# Patient Record
Sex: MALE | Race: WHITE | NOT HISPANIC OR LATINO | Employment: UNEMPLOYED | URBAN - NONMETROPOLITAN AREA
[De-identification: names, ages, dates, MRNs, and addresses within clinical notes are randomized per-mention and may not be internally consistent; named-entity substitution may affect disease eponyms.]

---

## 2022-03-23 ENCOUNTER — HOSPITAL ENCOUNTER (EMERGENCY)
Facility: HOSPITAL | Age: 42
Discharge: HOME | End: 2022-03-24
Attending: EMERGENCY MEDICINE | Admitting: EMERGENCY MEDICINE
Payer: COMMERCIAL

## 2022-03-23 ENCOUNTER — APPOINTMENT (EMERGENCY)
Dept: RADIOLOGY | Facility: HOSPITAL | Age: 42
End: 2022-03-23
Payer: COMMERCIAL

## 2022-03-23 DIAGNOSIS — T14.8XXA PUNCTURE WOUND: Primary | ICD-10-CM

## 2022-03-23 PROCEDURE — 99284 EMERGENCY DEPT VISIT MOD MDM: CPT | Performed by: EMERGENCY MEDICINE

## 2022-03-23 PROCEDURE — 73650 X-RAY EXAM OF HEEL: CPT

## 2022-03-23 PROCEDURE — 90471 IMMUNIZATION ADMIN: CPT

## 2022-03-23 PROCEDURE — 99283 EMERGENCY DEPT VISIT LOW MDM: CPT

## 2022-03-23 RX ORDER — CEPHALEXIN 250 MG/1
500 CAPSULE ORAL ONCE
Status: COMPLETED | OUTPATIENT
Start: 2022-03-24 | End: 2022-03-24

## 2022-03-23 RX ORDER — CEPHALEXIN 500 MG/1
500 CAPSULE ORAL EVERY 6 HOURS SCHEDULED
Qty: 28 CAPSULE | Refills: 0 | Status: SHIPPED | OUTPATIENT
Start: 2022-03-23 | End: 2022-03-30

## 2022-03-24 VITALS
SYSTOLIC BLOOD PRESSURE: 163 MMHG | RESPIRATION RATE: 18 BRPM | HEART RATE: 86 BPM | OXYGEN SATURATION: 97 % | TEMPERATURE: 97.6 F | DIASTOLIC BLOOD PRESSURE: 86 MMHG | WEIGHT: 182.32 LBS

## 2022-03-24 PROCEDURE — 90715 TDAP VACCINE 7 YRS/> IM: CPT

## 2022-03-24 RX ADMIN — CEPHALEXIN 500 MG: 250 CAPSULE ORAL at 00:23

## 2022-03-24 RX ADMIN — TETANUS TOXOID, REDUCED DIPHTHERIA TOXOID AND ACELLULAR PERTUSSIS VACCINE, ADSORBED 0.5 ML: 5; 2.5; 8; 8; 2.5 SUSPENSION INTRAMUSCULAR at 00:23

## 2022-03-24 NOTE — ED PROVIDER NOTES
Final Diagnosis:  1  Puncture wound        Chief Complaint   Patient presents with    Foreign Body in Skin     stepped on a twin nail about 30 min ago, did pull it out  bottom left heel  HPI  Patient presents for evaluation of superficial wound  Patient states that he was walking backwards while barefoot any stepped on a board causing a nail to puncture his foot approximately 2 cm deep  He then immediately removed this  Mild pain at the time  Area was hemostatic  Patient denies any pain at this time  He presents now to the emergency department because he states that he knows that he needs a tetanus shot  Patient with no other complaints  Unless otherwise specified:  - No language barrier    - History obtained from patient  - There are no limitations to the history obtained  - Previous charting was reviewed    PMH:   has no past medical history on file  PSH:   has no past surgical history on file  ROS:  Review of Systems   -   - 13 point ROS was performed and all are normal unless stated in the history above  - Nursing note reviewed  Vitals reviewed  - Orders placed by myself and/or advanced practitioner / resident  PE:   Vitals:    03/23/22 2341   BP: 163/86   BP Location: Left arm   Pulse: 86   Resp: 18   SpO2: 97%   Weight: 82 7 kg (182 lb 5 1 oz)     Vitals reviewed by me  Patient with small puncture room at the base of the heel  Hemostatic  No tenderness with palpation  Unless otherwise specified above:    General: VS reviewed  Appears in NAD    Head: Normocephalic, atraumatic  Eyes: EOM-I  No exudate  ENT: Atraumatic external nose and ears  No malocclusion  No stridor  No drooling  Neck: No JVD  CV: No pallor noted  Lungs:   No tachypnea  No respiratory distress    Abdomen:  Soft, non-tender, non-distended    MSK:   FROM spontaneously  No obvious deformity    Skin: Dry, intact  No obvious rash  Neuro: Awake, alert, GCS15, CN II-XII grossly intact  Speaking in full sentences  Motor grossly intact  Psychiatric/Behavioral: Appropriate mood and affect   Exam: deferred    Physical Exam     Procedures   A:  - Nursing note reviewed  XR heel / calcaneus 2+ views LEFT    (Results Pending)     Orders Placed This Encounter   Procedures    XR heel / calcaneus 2+ views LEFT     Labs Reviewed - No data to display      Final Diagnosis:  1  Puncture wound        P:  - patient presents with puncture terrence from nail  Will update tetanus and provide antibiotics  X-ray to rule out retained foreign body   -I reviewed the patient's x-rays bedside  To me it appears that he does have a puncture terrence in his left heel red do not appreciate any radiopaque foreign bodies  I discussed with the patient that at this time based on the injury of believe that there will be a low likelihood that we would need to retrieve anything even should there be a foreign body  Provided him with prescription for antibiotics  Discussed return precautions  Medications   tetanus-diphtheria-acellular pertussis (BOOSTRIX) IM injection 0 5 mL (has no administration in time range)   cephalexin (KEFLEX) capsule 500 mg (has no administration in time range)     Time reflects when diagnosis was documented in both MDM as applicable and the Disposition within this note     Time User Action Codes Description Comment    3/23/2022 11:55 PM Francisco Welch Add Ayo Morocho  8XXA] Puncture wound       ED Disposition     ED Disposition Condition Date/Time Comment    Discharge Stable Wed Mar 23, 2022 11:55 PM Dakota Vasquez discharge to home/self care              Follow-up Information     Follow up With Specialties Details Why [address], MD Internal Medicine   [address] [facility]  [phone]          Patient's Medications   Discharge Prescriptions    CEPHALEXIN (KEFLEX) 500 MG CAPSULE    Take 1 capsule (500 mg total) by mouth every 6 (six) hours for 7 days       Start Date: 3/23/2022 End Date: 3/30/2022       Order Dose: 500 mg       Quantity: 28 capsule    Refills: 0     No discharge procedures on file  None       Portions of the record may have been created with voice recognition software  Occasional wrong word or "sound a like" substitutions may have occurred due to the inherent limitations of voice recognition software  Read the chart carefully and recognize, using context, where substitutions have occurred      Electronically signed by:  MD Rudi Frausto MD  03/24/22 7834

## 2022-03-29 ENCOUNTER — APPOINTMENT (EMERGENCY)
Dept: RADIOLOGY | Facility: HOSPITAL | Age: 42
End: 2022-03-29
Payer: COMMERCIAL

## 2022-03-29 ENCOUNTER — APPOINTMENT (EMERGENCY)
Dept: CT IMAGING | Facility: HOSPITAL | Age: 42
End: 2022-03-29
Payer: COMMERCIAL

## 2022-03-29 ENCOUNTER — HOSPITAL ENCOUNTER (EMERGENCY)
Facility: HOSPITAL | Age: 42
Discharge: HOME | End: 2022-03-29
Attending: EMERGENCY MEDICINE
Payer: COMMERCIAL

## 2022-03-29 VITALS
TEMPERATURE: 98.6 F | BODY MASS INDEX: 28.04 KG/M2 | OXYGEN SATURATION: 98 % | DIASTOLIC BLOOD PRESSURE: 88 MMHG | SYSTOLIC BLOOD PRESSURE: 185 MMHG | HEART RATE: 72 BPM | HEIGHT: 68 IN | RESPIRATION RATE: 18 BRPM | WEIGHT: 185 LBS

## 2022-03-29 DIAGNOSIS — M25.562 LEFT KNEE PAIN: Primary | ICD-10-CM

## 2022-03-29 DIAGNOSIS — S83.92XA LEFT KNEE SPRAIN: ICD-10-CM

## 2022-03-29 DIAGNOSIS — M25.462 EFFUSION OF LEFT KNEE JOINT: ICD-10-CM

## 2022-03-29 PROCEDURE — G1004 CDSM NDSC: HCPCS

## 2022-03-29 PROCEDURE — 99284 EMERGENCY DEPT VISIT MOD MDM: CPT

## 2022-03-29 PROCEDURE — 96372 THER/PROPH/DIAG INJ SC/IM: CPT

## 2022-03-29 PROCEDURE — 73564 X-RAY EXAM KNEE 4 OR MORE: CPT

## 2022-03-29 PROCEDURE — 73700 CT LOWER EXTREMITY W/O DYE: CPT

## 2022-03-29 PROCEDURE — 99284 EMERGENCY DEPT VISIT MOD MDM: CPT | Performed by: PHYSICIAN ASSISTANT

## 2022-03-29 RX ORDER — CLONAZEPAM 0.5 MG/1
0.5 TABLET ORAL 3 TIMES DAILY PRN
COMMUNITY
Start: 2022-02-15

## 2022-03-29 RX ORDER — NAPROXEN 500 MG/1
500 TABLET ORAL 2 TIMES DAILY WITH MEALS
Qty: 30 TABLET | Refills: 0 | Status: SHIPPED | OUTPATIENT
Start: 2022-03-29

## 2022-03-29 RX ORDER — KETOROLAC TROMETHAMINE 30 MG/ML
15 INJECTION, SOLUTION INTRAMUSCULAR; INTRAVENOUS ONCE
Status: COMPLETED | OUTPATIENT
Start: 2022-03-29 | End: 2022-03-29

## 2022-03-29 RX ORDER — ALLOPURINOL 300 MG/1
300 TABLET ORAL DAILY
COMMUNITY
Start: 2021-11-03 | End: 2022-11-03

## 2022-03-29 RX ORDER — LOSARTAN POTASSIUM 50 MG/1
1 TABLET ORAL DAILY
COMMUNITY
Start: 2022-03-21

## 2022-03-29 RX ORDER — COLCHICINE 0.6 MG/1
0.6 TABLET ORAL 3 TIMES DAILY PRN
COMMUNITY
Start: 2021-10-28

## 2022-03-29 RX ADMIN — KETOROLAC TROMETHAMINE 15 MG: 30 INJECTION, SOLUTION INTRAMUSCULAR at 13:46

## 2022-03-29 NOTE — ED PROVIDER NOTES
History  Chief Complaint   Patient presents with    Knee Pain     left knee pain after working out last night     Patient is a 41 y/o male presenting to the ED for evaluation of left knee pain  Patient was doing sit-ups on a sit-up bench with his knees flexed when he felt a "snap" in his knee  He had had increased difficulty bearing weight since this occurred and cannot fully flex the knee secondary to pain  He feels instability in the knee when he bears weight on it and says it feels as if it is going to give out on him  He has also noticed some swelling around and above the knee  He denies numbness/weakness in the extremity  Prior to Admission Medications   Prescriptions Last Dose Informant Patient Reported? Taking?   allopurinol (ZYLOPRIM) 300 mg tablet   Yes Yes   Sig: Take 300 mg by mouth daily   cephalexin (KEFLEX) 500 mg capsule   No No   Sig: Take 1 capsule (500 mg total) by mouth every 6 (six) hours for 7 days   clonazePAM (KlonoPIN) 0 5 mg tablet Not Taking at Unknown time  Yes No   Sig: Take 0 5 mg by mouth Three times daily as needed   Patient not taking: Reported on 3/29/2022    colchicine (COLCRYS) 0 6 mg tablet   Yes Yes   Sig: Take 0 6 mg by mouth Three times daily as needed   losartan (COZAAR) 50 mg tablet   Yes Yes   Sig: Take 1 tablet by mouth daily      Facility-Administered Medications: None       History reviewed  No pertinent past medical history  History reviewed  No pertinent surgical history  History reviewed  No pertinent family history  I have reviewed and agree with the history as documented      E-Cigarette/Vaping    E-Cigarette Use Never User      E-Cigarette/Vaping Substances    Nicotine No     THC No     CBD No     Flavoring No     Other No     Unknown No      Social History     Tobacco Use    Smoking status: Current Every Day Smoker     Packs/day: 1 00    Smokeless tobacco: Never Used   Vaping Use    Vaping Use: Never used   Substance Use Topics    Alcohol use: Not Currently    Drug use: Not Currently       Review of Systems   Constitutional: Negative for chills, diaphoresis, fatigue and fever  HENT: Negative for congestion, ear pain, mouth sores, rhinorrhea, sinus pain, sore throat and trouble swallowing  Eyes: Negative for photophobia and visual disturbance  Respiratory: Negative for cough, chest tightness, shortness of breath and wheezing  Cardiovascular: Negative for chest pain, palpitations and leg swelling  Gastrointestinal: Negative for abdominal pain, blood in stool, constipation, diarrhea, nausea and vomiting  Genitourinary: Negative for dysuria, flank pain, frequency, hematuria and urgency  Musculoskeletal: Positive for arthralgias (left knee pain/injury)  Negative for back pain, joint swelling, myalgias and neck pain  Skin: Negative for color change, pallor and rash  Neurological: Negative for dizziness, syncope, speech difficulty, weakness, light-headedness, numbness and headaches  Psychiatric/Behavioral: Negative for confusion and sleep disturbance  All other systems reviewed and are negative  Physical Exam  Physical Exam  Vitals and nursing note reviewed  Constitutional:       General: He is awake  He is not in acute distress  Appearance: Normal appearance  He is well-developed  He is not ill-appearing or diaphoretic  HENT:      Head: Normocephalic and atraumatic  Right Ear: External ear normal       Left Ear: External ear normal       Nose: Nose normal       Mouth/Throat:      Lips: Pink  Mouth: Mucous membranes are moist    Eyes:      General: Lids are normal  No scleral icterus  Conjunctiva/sclera: Conjunctivae normal       Pupils: Pupils are equal, round, and reactive to light  Cardiovascular:      Rate and Rhythm: Normal rate and regular rhythm  Pulses: Normal pulses  Radial pulses are 2+ on the right side and 2+ on the left side        Heart sounds: Normal heart sounds, S1 normal and S2 normal    Pulmonary:      Effort: Pulmonary effort is normal  No accessory muscle usage  Breath sounds: Normal breath sounds  No stridor  No decreased breath sounds, wheezing, rhonchi or rales  Abdominal:      General: Abdomen is flat  Bowel sounds are normal  There is no distension  Palpations: Abdomen is soft  Tenderness: There is no abdominal tenderness  There is no right CVA tenderness, left CVA tenderness, guarding or rebound  Musculoskeletal:      Cervical back: Full passive range of motion without pain, normal range of motion and neck supple  No signs of trauma  No pain with movement  Normal range of motion  Left knee: Swelling and effusion present  No deformity  Decreased range of motion  Tenderness present over the lateral joint line, ACL and patellar tendon  Right lower leg: No edema  Left lower leg: No edema  Comments: Diffuse tenderness of joint as well as tenderness over the medial suprapatellar region with palpable swelling  No obvious deformity  Palpable bulge over region of quadriceps tendon  Patient is able to lift heel off bed when supine but has difficulty with full extension  Unable to flex knee past 45° without pain  No obvious laxity on exam  PT/DP pulses 2+  Distal strength 5/5  Sensation intact  No overlying erythema/warmth of the joint  Lymphadenopathy:      Cervical: No cervical adenopathy  Skin:     General: Skin is warm and dry  Capillary Refill: Capillary refill takes less than 2 seconds  Coloration: Skin is not cyanotic, jaundiced or pale  Neurological:      Mental Status: He is alert and oriented to person, place, and time  GCS: GCS eye subscore is 4  GCS verbal subscore is 5  GCS motor subscore is 6  Cranial Nerves: No dysarthria or facial asymmetry        Gait: Gait normal    Psychiatric:         Attention and Perception: Attention normal          Mood and Affect: Mood normal          Speech: Speech normal          Behavior: Behavior normal  Behavior is cooperative  Vital Signs  ED Triage Vitals [03/29/22 1217]   Temperature Pulse Respirations Blood Pressure SpO2   98 6 °F (37 °C) 72 18 (!) 185/88 98 %      Temp Source Heart Rate Source Patient Position - Orthostatic VS BP Location FiO2 (%)   Tympanic Monitor Sitting Right arm --      Pain Score       8           Vitals:    03/29/22 1217   BP: (!) 185/88   Pulse: 72   Patient Position - Orthostatic VS: Sitting         Visual Acuity      ED Medications  Medications   ketorolac (TORADOL) injection 15 mg (15 mg Intramuscular Given 3/29/22 1346)       Diagnostic Studies  Results Reviewed     None                 CT lower extremity wo contrast left   Final Result by Rashida Alberto DO (03/29 1438)      No acute osseous abnormality  Moderate joint effusion  Quadriceps and patellar tendons are intact  Edema along the course of the ACL, which appears grossly intact  If there is concern for internal derangement, MRI should be performed when clinically appropriate  Workstation performed: [id number]         XR knee 4+ vw left injury   Final Result by Inocencia Sams MD (03/29 1414)      No acute fracture or dislocation identified  Workstation performed: [id number]                    Procedures  Procedures         ED Course                               SBIRT 22yo+      Most Recent Value   SBIRT (23 yo +)    In order to provide better care to our patients, we are screening all of our patients for alcohol and drug use  Would it be okay to ask you these screening questions? Yes Filed at: 03/29/2022 1219   Initial Alcohol Screen: US AUDIT-C     1  How often do you have a drink containing alcohol? 0 Filed at: 03/29/2022 1219   2  How many drinks containing alcohol do you have on a typical day you are drinking? 0 Filed at: 03/29/2022 1219   3a  Male UNDER 65: How often do you have five or more drinks on one occasion?  0 Filed at: 03/29/2022 1219 3b  FEMALE Any Age, or MALE 65+: How often do you have 4 or more drinks on one occassion? 0 Filed at: 03/29/2022 1219   Audit-C Score 0 Filed at: 03/29/2022 1219   [place]: How many times in the past year have you    Used an illegal drug or used a prescription medication for non-medical reasons? Never Filed at: 03/29/2022 1219                    [facility]  Number of Diagnoses or Management Options  Effusion of left knee joint  Left knee pain  Left knee sprain  Diagnosis management comments: Patient is a 41 y/o male presenting to the ED for evaluation of left knee pain  X-ray shows no acute osseous abnormality  Due to concern for possible partial quadriceps tendon rupture, CT was performed  CT showed a moderate joint effusion and ACL edema but otherwise unremarkable  Patient will need MRI with orthopedic follow-up  Knee immobilizer and crutches provided  Encouraged rest, ice and elevation  Naproxen sent to patient's pharmacy  The management plan was discussed in detail with the patient at bedside and all questions were answered  Strict ED return instructions were discussed at bedside  Prior to discharge, both verbal and written instructions were provided  We discussed the signs and symptoms that should prompt the patient to return to the ED  All questions were answered and the patient was comfortable with the plan of care and discharged home  The patient agrees to return to the Emergency Department for concerns and/or progression of illness         Amount and/or Complexity of Data Reviewed  Tests in the radiology section of CPT®: ordered and reviewed    Patient Progress  Patient progress: stable      Disposition  Final diagnoses:   Left knee pain   Effusion of left knee joint   Left knee sprain     Time reflects when diagnosis was documented in both MDM as applicable and the Disposition within this note     Time User Action Codes Description Comment    3/29/2022  2:40 PM Skipper Johnson Add [M23 162] Left knee pain 3/29/2022  2:40 PM Love Francisco Add [M25 462] Effusion of left knee joint     3/29/2022  2:40 PM Love Francisco [[id number]] Left knee sprain       ED Disposition     ED Disposition Condition Date/Time Comment    Discharge Stable Tue Mar 29, 2022  2:40 PM Macedo Lacheko discharge to home/self care              Follow-up Information     Follow up With Specialties Details Why Contact Info Additional [address] [facility] Orthopedic Surgery Schedule an appointment as soon as possible for a visit   [address] [facility] [address]  [address] [facility] Mireya Evangelina [address] [facility], [facility], [place], [address]    Aly Wright MD Internal Medicine  As needed [address] [address] [phone]       [facility] Emergency Department Emergency Medicine  If symptoms worsen [place] [phone]  [address] [facility] Emergency Department, [address], [address]          Discharge Medication List as of 3/29/2022  2:42 PM      START taking these medications    Details   naproxen (Naprosyn) 500 mg tablet Take 1 tablet (500 mg total) by mouth 2 (two) times a day with meals, Starting Tue 3/29/2022, Normal         CONTINUE these medications which have NOT CHANGED    Details   allopurinol (ZYLOPRIM) 300 mg tablet Take 300 mg by mouth daily, Starting Wed 11/3/2021, Until Thu 11/3/2022, Historical Med      colchicine (COLCRYS) 0 6 mg tablet Take 0 6 mg by mouth Three times daily as needed, Starting Thu 10/28/2021, Historical Med      losartan (COZAAR) 50 mg tablet Take 1 tablet by mouth daily, Starting Mon 3/21/2022, Historical Med      cephalexin (KEFLEX) 500 mg capsule Take 1 capsule (500 mg total) by mouth every 6 (six) hours for 7 days, Starting Wed 3/23/2022, Until Wed 3/30/2022, Normal      clonazePAM (KlonoPIN) 0 5 mg tablet Take 0 5 mg by mouth Three times daily as needed, Starting Tue 2/15/2022, Historical Med                 PDMP Review     None          ED Provider  Electronically Signed by           Chino Powell PA-C  03/29/22 5109

## 2022-04-01 NOTE — TELEPHONE ENCOUNTER
Lmom to call the number on the back of his insurance card to find a dr that participate with his insurance [phone]   We do not take it